# Patient Record
Sex: FEMALE | Race: BLACK OR AFRICAN AMERICAN
[De-identification: names, ages, dates, MRNs, and addresses within clinical notes are randomized per-mention and may not be internally consistent; named-entity substitution may affect disease eponyms.]

---
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
## 2017-03-26 NOTE — RAD
Indication pain.



Internally and externally rotated views of the right shoulder as well as a Y

view were obtained.



Best demonstrated on the externally rotated view is a nondisplaced fracture

involving the greater tubercle. An additional bony abnormality is not seen.



IMPRESSION: Traumatic, nondisplaced, fracture of the greater tubercle
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
## 2019-10-24 NOTE — PHYS DOC
Past Medical History


Past Medical History:  No Pertinent History


Past Surgical History:  No Surgical History


Alcohol Use:  Occasionally


Drug Use:  None





Adult General


Chief Complaint


Chief Complaint:  COUGH





HPI


HPI





Patient is a 33  year old female who presents to the due to chief complaint of 

cough and congestion and sinus tenderness. Patient states that the symptoms and 

present for the last 3-5 days. Patient does admit to being an active smoker. 

Patient denies rhinorrhea. Patient also states that she does not have any fever,

chills, nausea or vomiting. Patient denies sick contacts at home.





Review of Systems


Review of Systems





Constitutional: Denies fever or chills []


HENT: Complains of congestion and sinus tenderness


Respiratory: Complains of cough


Cardiovascular: Denies chest pain


GI: Denies abdominal pain, nausea, vomiting, bloody stools or diarrhea []


: Denies dysuria or hematuria []


Musculoskeletal: Denies back pain or joint pain []


Neurologic: Denies headache, focal weakness or sensory changes []





All other systems were reviewed and found to be within normal limits, except as 

documented in this note.





Allergies


Allergies





Allergies








Coded Allergies Type Severity Reaction Last Updated Verified


 


  No Known Drug Allergies    3/26/17 No











Physical Exam


Physical Exam








Constitutional: Well developed, well nourished, no acute distress, non-toxic 

appearance.


HENT: Normocephalic, atraumatic, bilateral maxillary sinus tenderness


Neck: Normal range of motion, no tenderness, supple


Cardiovascular:Heart rate regular rhythm, no murmur 


Resp:  Bilateral breath sounds clear to auscultation 


Abdomen: Soft, no tenderness, no distension


Skin: Warm, dry, no erythema, no rash.


Back: No tenderness, no CVA tenderness.


Extremities: No tenderness, ROM intact, no edema.


Neurologic: Alert and oriented X 3, normal motor function, normal sensory 

function, no focal deficits noted.


Psychologic: Affect normal, judgement normal, mood normal.





Current Patient Data


Vital Signs





                                   Vital Signs








  Date Time  Temp Pulse Resp B/P (MAP) Pulse Ox O2 Delivery O2 Flow Rate FiO2


 


10/24/19 04:25 98.4 112 24 138/84 (102) 100 Room Air  





 98.4       











EKG


EKG


[]





Radiology/Procedures


Radiology/Procedures


[]





Course & Med Decision Making


Course & Med Decision Making








Patient has maxillary sinus tenderness. We'll treat with antibiotics.


Patient is also given cough medication.








No need for any further testing currently


discussed plan of care with patient and friend.





Dragon Disclaimer


Dragon Disclaimer


This electronic medical record was generated, in whole or in part, using a voice

 recognition dictation system.





Departure


Departure


Impression:  


   Primary Impression:  


   Sinusitis


   Additional Impression:  


   URI, acute


Disposition:  01 HOME, SELF-CARE


Condition:  STABLE


Referrals:  


NO PCP (PCP)


Patient Instructions:  Sinusitis





Additional Instructions:  


Discussed  results and plan of care with patient.


Patient is instructed to follow up with PCP in one to 2 days.


Appropriate discharge instructions given to patient to return to the ED or to 

seek immediate medical evaluation.


Patient is instructed to return to the ED if symptoms worsen or if any concerns.


Scripts


Benzonatate (TESSALON PERLE) 100 Mg Capsule


1 CAP PO TID, #21 CAP


   Prov: CHELSEA RAMOS DO         10/24/19 


Amoxicillin (AMOXICILLIN) 500 Mg Capsule


1 CAP PO TID for 10 Days, #30 CAP


   Prov: CHELSEA RAMOS DO         10/24/19





Problem Qualifiers











CHELSEA RAMOS DO           Oct 24, 2019 05:07

## 2020-08-11 ENCOUNTER — HOSPITAL ENCOUNTER (EMERGENCY)
Dept: HOSPITAL 61 - ER | Age: 35
Discharge: HOME | End: 2020-08-11
Payer: MEDICAID

## 2020-08-11 VITALS — WEIGHT: 227.52 LBS | BODY MASS INDEX: 40.31 KG/M2 | HEIGHT: 63 IN

## 2020-08-11 VITALS — DIASTOLIC BLOOD PRESSURE: 67 MMHG | SYSTOLIC BLOOD PRESSURE: 106 MMHG

## 2020-08-11 DIAGNOSIS — R50.9: ICD-10-CM

## 2020-08-11 DIAGNOSIS — F17.210: ICD-10-CM

## 2020-08-11 DIAGNOSIS — Z20.828: ICD-10-CM

## 2020-08-11 DIAGNOSIS — O99.332: ICD-10-CM

## 2020-08-11 DIAGNOSIS — Z3A.24: ICD-10-CM

## 2020-08-11 DIAGNOSIS — R05: ICD-10-CM

## 2020-08-11 DIAGNOSIS — O99.512: Primary | ICD-10-CM

## 2020-08-11 PROCEDURE — 99285 EMERGENCY DEPT VISIT HI MDM: CPT

## 2020-08-11 PROCEDURE — 71045 X-RAY EXAM CHEST 1 VIEW: CPT

## 2020-08-11 NOTE — RAD
AP chest.

 

HISTORY: Cough, Covid exposure

 

AP view was taken of the chest. Heart is normal in size. There is no 

pleural effusion. There are no confluent infiltrates.

 

IMPRESSION:

1. No acute infiltrates.

 

Electronically signed by: Jet Hernandez MD (8/11/2020 12:48 PM) [id number]

## 2020-08-11 NOTE — PHYS DOC
Past Medical History


Past Medical History:  No Pertinent History


Past Surgical History:  No Surgical History


Additional Past Surgical Histo:  L FIBULA FX REPAIR 2017


Smoking Status:  Current Every Day Smoker


Additional Information:  


4-6 CIG/DAY


Alcohol Use:  Occasionally


Drug Use:  None





General Adult


EDM:


Chief Complaint:  COUGH





HPI:


HPI:





Patient is a 34  year old female who is 6 months pregnant, presented to ER today

for evaluation of cough, congestion, fever and chill for about 10 days.  Patient

says she was tested as negative for COVID-19 at [facility] 2 days ago.  

Patient continued to cough and had congestion so she came here for evaluation.  

Patient denies any chest pain, no abdominal pain, no nausea vomiting.  Patient 

denies any vaginal bleeding or discharge.  Patient is not concerned about her 

pregnancy status at this time.  Patient has been taking cough medication but she

still has this cough.





Review of Systems:


Review of Systems:


Constitutional:   Denies fever or chills. []


Eyes:   Denies change in visual acuity. []


HENT:   Positive for nasal congestion, no sore throat.


Respiratory:   Positive for cough, no trouble breathing


Cardiovascular:   Denies chest pain or edema. [] 


GI:   Denies abdominal pain, nausea, vomiting, bloody stools or diarrhea. [] 


:  Denies dysuria. [] 


Musculoskeletal:   Denies back pain or joint pain. [] 


Integument:   Denies rash. [] 


Neurologic:   Denies headache, focal weakness or sensory changes. [] 


Endocrine:   Denies polyuria or polydipsia. [] 


Lymphatic:  Denies swollen glands. [] 


Psychiatric:  Denies depression or anxiety. []





Heart Score:


Risk Factors:


Risk Factors:  DM, Current or recent (<one month) smoker, HTN, HLP, family 

history of CAD, obesity.


Risk Scores:


Score 0 - 3:  2.5% MACE over next 6 weeks - Discharge Home


Score 4 - 6:  20.3% MACE over next 6 weeks - Admit for Clinical Observation


Score 7 - 10:  72.7% MACE over next 6 weeks - Early Invasive Strategies





Allergies:


Allergies:





Allergies








Coded Allergies Type Severity Reaction Last Updated Verified


 


  No Known Drug Allergies    3/26/17 No











Physical Exam:


PE:





Constitutional: Well developed, well nourished, no acute distress, non-toxic 

appearance. []


HENT: Normocephalic, atraumatic, bilateral external ears normal, oropharynx 

moist, no oral exudates, nose normal. []


Eyes: PERRLA, EOMI, conjunctiva normal, no discharge. [] 


Neck: Normal range of motion, no tenderness, supple, no stridor. [] 


Cardiovascular:Heart rate regular rhythm, no murmur []


Lungs & Thorax:  Bilateral breath sounds clear to auscultation []


Abdomen: Bowel sounds normal, soft, no tenderness, no masses, no pulsatile 

masses. [] 


Skin: Warm, dry, no erythema, no rash. [] 


Back: No tenderness, no CVA tenderness. [] 


Extremities: No tenderness, no cyanosis, no clubbing, ROM intact, no edema. [] 


Neurologic: Alert and oriented X 3, normal motor function, normal sensory 

function, no focal deficits noted. []


Psychologic: Affect normal, judgement normal, mood normal. []





Current Patient Data:


Vital Signs:





                                   Vital Signs








  Date Time  Temp Pulse Resp B/P (MAP) Pulse Ox O2 Delivery O2 Flow Rate FiO2


 


20 09:42 97.1 102 22 117/74 (88) 99 Room Air  





 97.1       











EKG:


EKG:


[]





Radiology/Procedures:


Radiology/Procedures:


[][facility]


                    [address]  [place], [place] [id number] [phone]


                                        


                                 IMAGING REPORT





                                     Signed





PATIENT: SHERIE JACKSON ACCOUNT: [id number]     MRN#: [id number]


: 1985           LOCATION: ER              AGE: 34


SEX: F                    EXAM DT: 20         ACCESSION#: [id number]


STATUS: REG ER            ORD. PHYSICIAN: JOSE WRAY DO


REASON: cough, COVID19 EXPOSURE, 6 MONTHS PREGNANT


PROCEDURE: CHEST AP ONLY





AP chest.


 


HISTORY: Cough, Covid exposure


 


AP view was taken of the chest. Heart is normal in size. There is no 


pleural effusion. There are no confluent infiltrates.


 


IMPRESSION:


1. No acute infiltrates.


 


Electronically signed by: Jet Fernandez MD (2020 12:48 PM) [id number]














DICTATED and SIGNED BY:     JET FERNANDZE MD


DATE:     20 1248





Course & Med Decision Making:


Course & Med Decision Making


Pertinent Labs and Imaging studies reviewed. (See chart for details)





Patient is a 34-year-old who is 6 months pregnant who is in no acute distress 

vital signs stable, she presented with cough and congestion.  Patient says she 

had positive exposure to COVID-19 infection.  Patient had tested negative for 

COVID-19 2 days ago.  Her chest x-ray today is normal, her vital signs stable, 

no further work-up needed at this time.





Dragon Disclaimer:


Dragon Disclaimer:


This electronic medical record was generated, in whole or in part, using a voice

 recognition dictation system.





Departure


Departure


Impression:  


   Primary Impression:  


   Cough


   Additional Impression:  


   Suspected 2019 novel coronavirus infection


Disposition:   HOME, SELF-CARE


Condition:  STABLE


Referrals:  


NO PCP (PCP)


FOLLOW UP WITH YOUR DOCTOR AS NEEDED


Patient Instructions:  Cough, Adult





Additional Instructions:  


You have been tested for or diagnosed with COVID-19. It is an infection caused 

by a new type 


of coronavirus. COVID-19 will cause cold-like or mild flu symptoms in most. It 

can cause 


more severe symptoms like problems breathing in some.





There is no treatment for COVID-19. The body will clear the infection over time.

 Self-care 


will help to ease discomfort.





Steps to Take:


Self-Care


Rest as needed. Healthy habits may help you feel better. Steps include:





Choose healthy foods including fruits and vegetables. Drink water throughout the

 day.


Get plenty of sleep each night.


If you smoke, try to quit. It may ease breathing.


Avoid alcohol.


Keep Others Healthy


The virus can spread to others. Droplets are released every time you sneeze or 

cough. The 


droplets can get into the mouth, nose, or eyes of people near you and lead to 

infection. To 


lower the chances of spreading COVID-19 to others:





Stay at home until your doctor has said it is safe to leave. If you tested 

positive this 


will mean staying isolated until both of the following are true:





At least 7 days have passed since the start of illness.


You are free of fever for at least 72 hours without the use of medicine.


During this time:





 - Avoid public areas, events, or transportation. Do not return to work or 

school until your 


doctor has said it is safe to do so.


 - Call ahead if you need to go to a medical center. Let them know you may have 

COVID-19. It 


will help them guide you where to go. They may also ask you to wear a facemask 

when you come 


to the office.


 - If you call for emergency medical services, let them know you may have COVID-

19.


While at home:





 - Try to avoid close contact with others. Stay about 6 feet away.


 - If possible, spend most of your time in a separate room from others.


 - Use a face mask if you will be in close contact with others such as sharing a

 room or 


vehicle.


 - Have someone wipe down common surfaces in the home. Use household  

every day on 


areas like doorknobs, counters, or sinks.


 - Cough or sneeze into a tissue. Throw the tissue away right after use. If a 

tissue is not 


available, cough or sneeze into your elbow.


 - Wash your hands often. Wash them after sneezing or coughing. Use soap and 

water and wash 


for at least 20 seconds. Alcohol based hand  can be used if soap and 

water is not 


available.


 - Do not prepare food for others. Avoid sharing personal items like forks, 

spoons, or 


toothbrushes.


 - Avoid close contact with pets while you are sick. There is no evidence of the

 virus 


passing to pets. This is a safety step until more is known about this virus.


Isolation can be frustrating. Social interaction can help. Keep in touch with 

friends and 


family through phone and tech options. You can still interact with others in 

your home, just 


keep a safe distance of about 6 feet.





Follow-up:


Your doctors office will check in with you to see if there are any changes in 

your health. 


You may be asked to keep track of symptoms to share with them. They will also 

let you know 


when you are clear to be in public again.





Problems to Look Out For:


Contact your doctor if your recovery is not going as you expect. Get emergency 

care if you 


have problems such as:





 - Trouble breathing


 - Nonstop chest pain or pressure


 - Changes in awareness, confusion, or problems waking


 - Lips or face have bluish color


 - Worsening of symptoms


If you think you have an emergency, call for emergency medical services right 

away.





As taken from [facility]





Justicifation of Admission Dx:


Justifications for Admission:


Justification of Admission Dx:  N/A











JOSE WRAY DO                Aug 11, 2020 13:09